# Patient Record
Sex: MALE | Race: WHITE | NOT HISPANIC OR LATINO | URBAN - NONMETROPOLITAN AREA
[De-identification: names, ages, dates, MRNs, and addresses within clinical notes are randomized per-mention and may not be internally consistent; named-entity substitution may affect disease eponyms.]

---

## 2017-10-30 ENCOUNTER — OFFICE VISIT (OUTPATIENT)
Dept: URGENT CARE | Facility: PHYSICIAN GROUP | Age: 22
End: 2017-10-30
Payer: COMMERCIAL

## 2017-10-30 VITALS
HEIGHT: 70 IN | WEIGHT: 131 LBS | RESPIRATION RATE: 18 BRPM | HEART RATE: 78 BPM | BODY MASS INDEX: 18.75 KG/M2 | TEMPERATURE: 98 F | DIASTOLIC BLOOD PRESSURE: 60 MMHG | SYSTOLIC BLOOD PRESSURE: 122 MMHG | OXYGEN SATURATION: 98 %

## 2017-10-30 DIAGNOSIS — J30.9 ACUTE ALLERGIC RHINITIS, UNSPECIFIED SEASONALITY, UNSPECIFIED TRIGGER: ICD-10-CM

## 2017-10-30 PROCEDURE — 99203 OFFICE O/P NEW LOW 30 MIN: CPT | Performed by: PHYSICIAN ASSISTANT

## 2017-10-30 RX ORDER — TRIAMCINOLONE ACETONIDE 40 MG/ML
40 INJECTION, SUSPENSION INTRA-ARTICULAR; INTRAMUSCULAR ONCE
Status: COMPLETED | OUTPATIENT
Start: 2017-10-30 | End: 2017-10-30

## 2017-10-30 RX ORDER — FLUTICASONE PROPIONATE 50 MCG
2 SPRAY, SUSPENSION (ML) NASAL DAILY
Qty: 1 BOTTLE | Refills: 1 | Status: SHIPPED | OUTPATIENT
Start: 2017-10-30

## 2017-10-30 RX ADMIN — TRIAMCINOLONE ACETONIDE 40 MG: 40 INJECTION, SUSPENSION INTRA-ARTICULAR; INTRAMUSCULAR at 11:17

## 2017-10-30 ASSESSMENT — ENCOUNTER SYMPTOMS
SHORTNESS OF BREATH: 0
COUGH: 1
CHILLS: 0
SORE THROAT: 0
FEVER: 0
WHEEZING: 0

## 2017-10-30 NOTE — PATIENT INSTRUCTIONS
Allergic Rhinitis  Allergic rhinitis is when the mucous membranes in the nose respond to allergens. Allergens are particles in the air that cause your body to have an allergic reaction. This causes you to release allergic antibodies. Through a chain of events, these eventually cause you to release histamine into the blood stream. Although meant to protect the body, it is this release of histamine that causes your discomfort, such as frequent sneezing, congestion, and an itchy, runny nose.   CAUSES  Seasonal allergic rhinitis (hay fever) is caused by pollen allergens that may come from grasses, trees, and weeds. Year-round allergic rhinitis (perennial allergic rhinitis) is caused by allergens such as house dust mites, pet dander, and mold spores.  SYMPTOMS  · Nasal stuffiness (congestion).  · Itchy, runny nose with sneezing and tearing of the eyes.  DIAGNOSIS  Your health care provider can help you determine the allergen or allergens that trigger your symptoms. If you and your health care provider are unable to determine the allergen, skin or blood testing may be used. Your health care provider will diagnose your condition after taking your health history and performing a physical exam. Your health care provider may assess you for other related conditions, such as asthma, pink eye, or an ear infection.  TREATMENT  Allergic rhinitis does not have a cure, but it can be controlled by:  · Medicines that block allergy symptoms. These may include allergy shots, nasal sprays, and oral antihistamines.  · Avoiding the allergen.  Hay fever may often be treated with antihistamines in pill or nasal spray forms. Antihistamines block the effects of histamine. There are over-the-counter medicines that may help with nasal congestion and swelling around the eyes. Check with your health care provider before taking or giving this medicine.  If avoiding the allergen or the medicine prescribed do not work, there are many new medicines  your health care provider can prescribe. Stronger medicine may be used if initial measures are ineffective. Desensitizing injections can be used if medicine and avoidance does not work. Desensitization is when a patient is given ongoing shots until the body becomes less sensitive to the allergen. Make sure you follow up with your health care provider if problems continue.  HOME CARE INSTRUCTIONS  It is not possible to completely avoid allergens, but you can reduce your symptoms by taking steps to limit your exposure to them. It helps to know exactly what you are allergic to so that you can avoid your specific triggers.  SEEK MEDICAL CARE IF:  · You have a fever.  · You develop a cough that does not stop easily (persistent).  · You have shortness of breath.  · You start wheezing.  · Symptoms interfere with normal daily activities.     This information is not intended to replace advice given to you by your health care provider. Make sure you discuss any questions you have with your health care provider.     Document Released: 09/12/2002 Document Revised: 01/08/2016 Document Reviewed: 08/25/2014  [employer] Interactive Patient Education ©2016 [employer]

## 2017-10-30 NOTE — PROGRESS NOTES
"Subjective:      Elio Horvath is a 21 y.o. male who presents with Allergic Rhinitis            Patient presents today with history of chronic environmental allergies which can be quite severe at times. Over the last 2 weeks symptoms have been very bothersome. Reports itchy/watery eyes, scratchy throat, runny nose, congestion, and nonproductive cough. He has tried antihistamines with limited improvement in symptoms. No fevers, chills, productive cough, or other complaints.      Other   This is a chronic problem. The current episode started more than 1 year ago. The problem occurs constantly. The problem has been waxing and waning. Associated symptoms include congestion and coughing. Pertinent negatives include no chills, fever or sore throat. Nothing aggravates the symptoms. Treatments tried: antihistamine. The treatment provided mild relief.       Review of Systems   Constitutional: Negative for chills and fever.   HENT: Positive for congestion. Negative for ear pain and sore throat.    Respiratory: Positive for cough. Negative for shortness of breath and wheezing.    Endo/Heme/Allergies: Positive for environmental allergies.     Allergies:Review of patient's allergies indicates no known allergies.    Current Outpatient Prescriptions Ordered in [facility]   Medication Sig Dispense Refill   • fluticasone (FLONASE) 50 MCG/ACT nasal spray Spray 2 Sprays in nose every day. 1 Bottle 1     No current Epic-ordered facility-administered medications on file.        History reviewed. No pertinent past medical history.    Social History   Substance Use Topics   • Smoking status: Never Smoker   • Smokeless tobacco: Never Used   • Alcohol use No       No family status information on file.   History reviewed. No pertinent family history.       Objective:     /60   Pulse 78   Temp 36.7 °C (98 °F)   Resp 18   Ht 1.778 m (5' 10\")   Wt 59.4 kg (131 lb)   SpO2 98%   BMI 18.80 kg/m²      Physical Exam   Constitutional: He is " oriented to person, place, and time. He appears well-developed and well-nourished. No distress.   HENT:   Head: Normocephalic and atraumatic.   Right Ear: External ear normal.   Left Ear: External ear normal.   Mouth/Throat: Oropharynx is clear and moist.   Canals and tympanic membranes unremarkable. Significant nasal mucosal edema with clear rhinorrhea. No sinus tenderness   Eyes: Right eye exhibits no discharge. Left eye exhibits no discharge.   Neck: Normal range of motion. Neck supple.   Cardiovascular: Normal rate and regular rhythm.    Pulmonary/Chest: Effort normal and breath sounds normal. He has no wheezes. He has no rales.   Neurological: He is alert and oriented to person, place, and time.   Skin: Skin is warm and dry. No rash noted. He is not diaphoretic.   Psychiatric: He has a normal mood and affect. His behavior is normal. Judgment and thought content normal.   Nursing note and vitals reviewed.              Assessment/Plan:     1. Acute allergic rhinitis, unspecified seasonality, unspecified trigger  fluticasone (FLONASE) 50 MCG/ACT nasal spray    triamcinolone acetonide (KENALOG-40) injection 40 mg    Chronic problem, much worse over the last week or 2. Start Flonase. Given Kenalog and written instructions. Follow-up with PCP.       [employer] Interactive Patient Education given: Allergic rhinitis    Please note that this dictation was created using voice recognition software. I have made every reasonable attempt to correct obvious errors, but I expect that there are errors of grammar and possibly content that I did not discover before finalizing the note.